# Patient Record
Sex: FEMALE | Race: WHITE | NOT HISPANIC OR LATINO
[De-identification: names, ages, dates, MRNs, and addresses within clinical notes are randomized per-mention and may not be internally consistent; named-entity substitution may affect disease eponyms.]

---
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
## 2021-01-25 ENCOUNTER — APPOINTMENT (OUTPATIENT)
Dept: PEDIATRIC SURGERY | Facility: CLINIC | Age: 21
End: 2021-01-25
Payer: COMMERCIAL

## 2021-01-25 VITALS
HEART RATE: 71 BPM | WEIGHT: 174.39 LBS | SYSTOLIC BLOOD PRESSURE: 108 MMHG | HEIGHT: 64 IN | BODY MASS INDEX: 29.77 KG/M2 | DIASTOLIC BLOOD PRESSURE: 76 MMHG

## 2021-01-25 PROCEDURE — 99243 OFF/OP CNSLTJ NEW/EST LOW 30: CPT

## 2021-01-25 PROCEDURE — 99072 ADDL SUPL MATRL&STAF TM PHE: CPT

## 2021-01-25 NOTE — CONSULT LETTER
[Dear  ___] : Dear  [unfilled], [Consult Letter:] : I had the pleasure of evaluating your patient, [unfilled]. [Please see my note below.] : Please see my note below. [Consult Closing:] : Thank you very much for allowing me to participate in the care of this patient.  If you have any questions, please do not hesitate to contact me. [Sincerely,] : Sincerely, [FreeTextEntry2] : Ovidio Polanco MD\par [address] [place] [address]\par [place] [id number] [FreeTextEntry3] : Inocente Gamez MD\par Associate Professor of Surgery and Pediatrics\par [employer] [facility]\par Pediatric Surgery\par [facility]\par [phone]\par

## 2021-01-25 NOTE — ASSESSMENT
[FreeTextEntry1] : Nadege appears to have pilonidal disease with some infection and inflammation in the area but nothing to drain as of yet.  \par I discussed the scenario with her and her mom and recommended warm soaks and baths and we have prescribed Augmentin.  They will followup with me in 1-2 weeks.

## 2021-01-25 NOTE — HISTORY OF PRESENT ILLNESS
[FreeTextEntry1] : Nadege is a 20 year old healthy female who presents here today with a likely pilonidal abscess. She developed pain approximately a week ago. She was able to appreciate fullness at her coccyx area. The pain and swelling has increased. She attends the [employer], and was seen by a PA who referred her to have an incision and drainage completed. No fevers reported.

## 2021-01-25 NOTE — REASON FOR VISIT
[Initial - Scheduled] : an initial, scheduled visit with concerns of [Pilonidal disease] : pilonidal disease  [Mother] : mother [Patient] : patient

## 2021-01-25 NOTE — PHYSICAL EXAM
[No Incision] : no incision [Acute Distress] : no acute distress [Pectus excavatum] : no pectus excavatum [Pectus carinatum] : no pectus carinatum [NL] : soft, not tender, not distended [TextBox_73] : sacrococcygeal area: mild amount hair; tender sacrococc midline with 2 small pits; no real fluctuance; mild amount erythema

## 2021-01-26 ENCOUNTER — NON-APPOINTMENT (OUTPATIENT)
Age: 21
End: 2021-01-26

## 2021-01-28 ENCOUNTER — APPOINTMENT (OUTPATIENT)
Dept: PEDIATRIC SURGERY | Facility: CLINIC | Age: 21
End: 2021-01-28
Payer: COMMERCIAL

## 2021-01-28 PROCEDURE — 11772 EXC PILONIDAL CYST COMP: CPT

## 2021-01-28 PROCEDURE — 99072 ADDL SUPL MATRL&STAF TM PHE: CPT

## 2021-01-28 PROCEDURE — 99204 OFFICE O/P NEW MOD 45 MIN: CPT | Mod: 57

## 2021-02-11 ENCOUNTER — APPOINTMENT (OUTPATIENT)
Dept: PEDIATRIC SURGERY | Facility: CLINIC | Age: 21
End: 2021-02-11

## 2021-02-25 NOTE — ADDENDUM
[FreeTextEntry1] : Documented by Sangita Man acting as a scribe for Dr. Glynn on 01/28/2021 .\par \par All medical record entries made by the Scribe were at my, Dr. Glynn, direction and personally dictated by me on 01/28/2021 . I have reviewed the chart and agree that the record accurately reflects my personal performance of the history, physical exam, assessment and plan. I have also personally directed, reviewed, and agree with the discharge instructions.\par

## 2021-02-25 NOTE — PHYSICAL EXAM
[No Incision] : no incision [NL] : no acute distress, alert [Acute Distress] : no acute distress [Pectus excavatum] : no pectus excavatum [Pectus carinatum] : no pectus carinatum [TextBox_59] : two small pits and a fluctuant area over the  cleft.  [TextBox_73] : \par two small pits and a fluctuant area over the [place] cleft

## 2021-02-25 NOTE — ASSESSMENT
[FreeTextEntry1] : Nadege is a 20 year old girl with pilonidal disease.\par \par I spent considerable time with Nadege and her mother discussing the etiology of pilonidal disease as well as the various treatment options, including conservative management with strict hair control and hygiene, minimal excision with trephination, and complete excision with cleft lift reconstruction. I discussed the risks, benefits and alternatives of the two surgical procedures including bleeding, infection, wound complications, poor wound healing, postoperative restrictions/expectations, and long-term recurrence. After our discussion both Nadege and her mother would like to proceed with incision and drainage of the abscess and excision of the pilonidal disease in the office today.\par \par PROCEDURE NOTE\par \par The risks, benefits and alternatives were discussed and consent obtained. I specifically discussed poor wound healing and recurrence.\par \par Patient position prone and buttocks taped laterally.\par Area prepped with betadine and draped\par Local analgesia injected to surgical site\par Abscess cavity opened and pus evacuated.\par All other openings excised with appropriately sized trephines\par Underlying cavity connected and curetted and all debris removed\par Cavity irrigated with saline and peroxide\par Sterile dressing placed over wounds\par \par Patient tolerated procedure well and advised to shower daily with wounds uncovered. Continue antibiotics.\par \par All questions answered, follow-up arranged

## 2021-02-25 NOTE — CONSULT LETTER
[Dear  ___] : Dear  [unfilled], [Consult Letter:] : I had the pleasure of evaluating your patient, [unfilled]. [Please see my note below.] : Please see my note below. [Consult Closing:] : Thank you very much for allowing me to participate in the care of this patient.  If you have any questions, please do not hesitate to contact me. [Sincerely,] : Sincerely, [FreeTextEntry2] : Ovidio Polanco MD\par [address] [place] [address]\par [place] [phone] [FreeTextEntry3] : Inocente Gamez MD\par Associate Professor of Surgery and Pediatrics\par [facility] [employer] at [facility]\par Pediatric Surgery\par [facility]\par [phone]\par

## 2021-02-25 NOTE — HISTORY OF PRESENT ILLNESS
[FreeTextEntry1] : Nadege is a 20 year old healthy female who presents here today to discuss surgical options for her pilonidal disease. She was seen a few days ago, and prescribed oral antibiotics. Since then Nadege states the area is more tender. She can feel it slightly increased in size. No drainage reported. No fevers reported. This is her first time dealing with pilonidal.

## 2021-02-25 NOTE — REASON FOR VISIT
[Follow-up - Scheduled] : a follow-up, scheduled visit for [Pilonidal disease] : pilonidal disease  [Mother] : mother [Patient] : patient [FreeTextEntry4] : Ovidio Polanco MD

## 2021-02-26 ENCOUNTER — APPOINTMENT (OUTPATIENT)
Dept: PEDIATRIC SURGERY | Facility: CLINIC | Age: 21
End: 2021-02-26
Payer: COMMERCIAL

## 2021-02-26 VITALS — WEIGHT: 170.64 LBS | TEMPERATURE: 98.3 F

## 2021-02-26 PROCEDURE — 99024 POSTOP FOLLOW-UP VISIT: CPT

## 2021-02-28 NOTE — ASSESSMENT
[FreeTextEntry1] : Nadege is a 20 year old girl here one month after his in office minimal excision procedure for pilonidal disease. She is doing very well. All her pits are closed and her pilonidal disease is completely healed. I counseled her on the continued 10-15% recurrence rate and encouraged her to minimize the recurrence risk with a strategy of continued hygiene and specific attention to hair control. She is free to return to full activities. I answered all of Nadege's question and would be happy to see her again anytime if there are any sign of recurrence or other issues/concerns.\par \par

## 2021-02-28 NOTE — CONSULT LETTER
[Dear  ___] : Dear  [unfilled], [Consult Letter:] : I had the pleasure of evaluating your patient, [unfilled]. [Please see my note below.] : Please see my note below. [Consult Closing:] : Thank you very much for allowing me to participate in the care of this patient.  If you have any questions, please do not hesitate to contact me. [Sincerely,] : Sincerely, [FreeTextEntry2] : Ovidio Polanco MD\par [address] [place] [address]\par [place] [id number]  [FreeTextEntry3] : Adelfo Glynn MD FAAP FACS\par Director, [facility]\par Division of Pediatric General, Thoracic and Endoscopic Surgery\par [facility]

## 2021-02-28 NOTE — ADDENDUM
[FreeTextEntry1] : Documented by Sangita Man acting as a scribe for Dr. Glynn on 02/26/2021 .\par \par All medical record entries made by the Scribe were at my, Dr. Glynn, direction and personally dictated by me on 02/26/2021 . I have reviewed the chart and agree that the record accurately reflects my personal performance of the history, physical exam, assessment and plan. I have also personally directed, reviewed, and agree with the discharge instructions.\par

## 2021-02-28 NOTE — REASON FOR VISIT
[Patient] : patient [Mother] : mother [Minimal excision of pilonidal disease] : minimal excision of pilonidal disease [de-identified] : 1/28/2021 [de-identified] : Dr. Glynn  [de-identified] : He is doing very well. She is not having any pain or drainage. She has not had any recent fevers. She reports that the wounds have healed and she is back to her baseline.

## 2021-02-28 NOTE — PHYSICAL EXAM
[Acute Distress] : no acute distress [TextBox_59] : All pits completely closed. No tenderness, erythema, fluctuance

## 2021-11-22 ENCOUNTER — EMERGENCY (EMERGENCY)
Facility: HOSPITAL | Age: 21
LOS: 1 days | Discharge: HOME | End: 2021-11-22
Attending: EMERGENCY MEDICINE
Payer: COMMERCIAL

## 2021-11-22 VITALS
HEART RATE: 70 BPM | OXYGEN SATURATION: 99 % | WEIGHT: 169.98 LBS | DIASTOLIC BLOOD PRESSURE: 93 MMHG | TEMPERATURE: 98 F | RESPIRATION RATE: 20 BRPM | HEIGHT: 65 IN | SYSTOLIC BLOOD PRESSURE: 133 MMHG

## 2021-11-22 VITALS
HEART RATE: 68 BPM | DIASTOLIC BLOOD PRESSURE: 82 MMHG | SYSTOLIC BLOOD PRESSURE: 122 MMHG | OXYGEN SATURATION: 98 % | RESPIRATION RATE: 20 BRPM

## 2021-11-22 DIAGNOSIS — K08.409 PARTIAL LOSS OF TEETH, UNSPECIFIED CAUSE, UNSPECIFIED CLASS: Chronic | ICD-10-CM

## 2021-11-22 LAB
HCOV PNL SPEC NAA+PROBE: DETECTED
RAPID RVP RESULT: DETECTED
SARS-COV-2 RNA SPEC QL NAA+PROBE: SIGNIFICANT CHANGE UP

## 2021-11-22 PROCEDURE — 0225U NFCT DS DNA&RNA 21 SARSCOV2: CPT

## 2021-11-22 PROCEDURE — 93308 TTE F-UP OR LMTD: CPT | Mod: 26

## 2021-11-22 PROCEDURE — 86664 EPSTEIN-BARR NUCLEAR ANTIGEN: CPT

## 2021-11-22 PROCEDURE — 86663 EPSTEIN-BARR ANTIBODY: CPT

## 2021-11-22 PROCEDURE — 99284 EMERGENCY DEPT VISIT MOD MDM: CPT | Mod: 25

## 2021-11-22 PROCEDURE — 93005 ELECTROCARDIOGRAM TRACING: CPT

## 2021-11-22 PROCEDURE — 99284 EMERGENCY DEPT VISIT MOD MDM: CPT | Mod: CS,25

## 2021-11-22 PROCEDURE — 36415 COLL VENOUS BLD VENIPUNCTURE: CPT

## 2021-11-22 PROCEDURE — 93010 ELECTROCARDIOGRAM REPORT: CPT | Mod: NC

## 2021-11-22 PROCEDURE — 93308 TTE F-UP OR LMTD: CPT

## 2021-11-22 PROCEDURE — 86665 EPSTEIN-BARR CAPSID VCA: CPT

## 2021-11-22 RX ORDER — ACETAMINOPHEN 500 MG
650 TABLET ORAL ONCE
Refills: 0 | Status: COMPLETED | OUTPATIENT
Start: 2021-11-22 | End: 2021-11-22

## 2021-11-22 RX ADMIN — Medication 650 MILLIGRAM(S): at 16:00

## 2021-11-22 NOTE — ED PROVIDER NOTE - PATIENT PORTAL LINK FT
You can access the FollowMyHealth Patient Portal offered by [facility] by registering at the following website: [email or web address]. By joining [employer]’s FollowMyHealth portal, you will also be able to view your health information using other applications (apps) compatible with our system.

## 2021-11-22 NOTE — ED PROVIDER NOTE - CLINICAL SUMMARY MEDICAL DECISION MAKING FREE TEXT BOX
ronni 22 yo f with no pmhx presents to ed w several days of chilss cough congestion sore throat - vaccinated strep negative mild pleurisy no sick contacts -- sent in for eval for pna - not tachypnic [id number] ra -- lungs cleat -- no fam ho cad or dvt/pe - no rsik factors - screening ekg but cp more likley pleurydynia   pocus to eval heart and lungs and rvp

## 2021-11-22 NOTE — ED PROVIDER NOTE - OBJECTIVE STATEMENT
20yo F PMH EBV presents from [facility] c/o pleuritic chest pain, cough, congestion, sore throat x4 days. Negative for strep at school. COVID vaccinated. No h/o DVT/PE. Not on AC. Non smoker. No SOB. Feels like mucus is stuck in her throat. Has taken Tylenol with some relief of symptoms. No FHx cardiac disease.

## 2021-11-22 NOTE — ED PROVIDER NOTE - NS ED ROS FT
Gen: No F/C/NS  Head: No falls   Eyes: No changes in vision   Resp: +cough, -trouble breathing  Cardiovascular: +chest pain -palpitation  Gastroenteric: No N/V/D  :  No change in urine output   MS: No joint or muscle pain  Neuro: No headache   Skin: No new rash

## 2021-11-22 NOTE — ED PROVIDER NOTE - NSFOLLOWUPCLINICS_GEN_ALL_ED_FT
[facility] - Primary Care  Primary Care  [address] [place] [address]  Phone: [phone]  Fax:

## 2021-11-22 NOTE — ED PROVIDER NOTE - NSFOLLOWUPINSTRUCTIONS_ED_ALL_ED_FT
Please follow up with your primary care doctor.    Please use the link below log into the patient portal to view your results post discharge:   [email or web address]      You should return to the hospital if you begin to have worsening or persistent chest pain especially that radiates to the arm/jaw/back, shortness of breath or chest pain with exertion that is different than normal for you, new or worsening in any lower extremity edema (swelling), sudden onset of cold sweats with difficulty breathing, or for any other concerns.      Pleurodynia      Pleurodynia is sudden, severe pain in the chest and abdomen that is caused by complications from a viral infection. Pleurodynia may also be called epidemic pleurodynia or [place] disease.    In most cases, pleurodynia goes away on its own in 2–5 days.      What are the causes?    This condition is usually caused by a virus called coxsackievirus B. In rare cases, other viruses may cause pleurodynia. These include coxsackievirus A or echoviruses.  Coxsackievirus B spreads easily from person to person (is contagious). It can be spread through:  •Coughing.      •Sneezing.      •Close physical contact with an infected person.      •Contact with the stool of an infected person.        What increases the risk?  This condition is more likely to develop in:  •Children.      •People that live in places where there are poor public health conditions (sanitation).      •People that live in places where there is overcrowding.        What are the signs or symptoms?    The main symptom of this condition is severe chest pain that makes breathing painful. The pain is usually on one side of the body, along the lower ribs. The pain starts suddenly and may last from a few seconds to 1 minute. You might feel pain again a few minutes or hours later.    These brief periods of pain usually come and go for 2–5 days. In some cases, pain may continue to come and go for as long as a month.  Other symptoms of pleurodynia may include:  •Abdominal pain.      •Fever.      •Headache.      •Sore throat.      •Feeling tired (fatigue).      •Pain when pressing on the chest or belly.      •Dry cough.      •Nausea or vomiting.      •Diarrhea.      •Rash.      •Testicle pain in men.        How is this diagnosed?  This condition is diagnosed based on:  •Your medical history.      •A physical exam.    •Tests, such as:  •A throat swab.      •Stool tests. You may need to give a stool sample to your health care provider.      •Blood tests.      •Chest X-rays.          How is this treated?    There is no specific treatment for this condition. However, symptoms go away within days or weeks. In the meantime, your health care provider may recommend medicine to help relieve pain and fever.      Follow these instructions at home:    Medicines     •Take over-the-counter and prescription medicines only as told by your health care provider.      •If your child has pleurodynia, do not give your child aspirin because of the association with Reye's syndrome.      General instructions     •Rest at home as told by your health care provider.      •Return to your normal activities as told by your health care provider. Ask your health care provider what activities are safe for you.      • Do not use any products that contain nicotine or tobacco, such as cigarettes, e-cigarettes, and chewing tobacco. If you need help quitting, ask your health care provider.      •Drink enough fluid to keep your urine pale yellow.      •Keep all follow-up visits as told by your health care provider. This is important.        How is this prevented?     •Wash your hands often to prevent the virus from spreading. If soap and water are not available, use alcohol-based hand .      •Make sure that other people in your household also wash their hands often.      •Use a bleach-based household  to clean and disinfect commonly used items and surfaces often.      • Do not share personal items such as toothbrushes and eating utensils.        Contact a health care provider if:    •Your symptoms last longer than 5 days.      •You have pain that does not get better with medicine.        Get help right away if:    •You have severe chest pain that is getting worse.      •You have difficulty breathing.      •You have a sudden, severe headache and a stiff neck.        Summary    •Pleurodynia is sudden, severe pain in the chest and abdomen that is caused by complications from a viral infection. It is usually caused by a virus called coxsackievirus B.      • Coxsackievirus B spreads easily from person to person (is contagious).      •There is no specific treatment for this condition. However, symptoms go away within days or weeks.      •Follow instructions from your health care provider about rest, medicines, activity, and drinking enough fluids. Keep all follow-up visits.      This information is not intended to replace advice given to you by your health care provider. Make sure you discuss any questions you have with your health care provider.

## 2021-11-22 NOTE — ED CLERICAL - NS ED CLERK NOTE PRE-ARRIVAL INFORMATION; ADDITIONAL PRE-ARRIVAL INFORMATION
CC/Reason For referral:  viral infection, fever, body aches, chest tightness.  covid vaxxed  Preferred Consultant(if applicable):  Who admits for you (if needed):  Do you have documents you would like to fax over?  Would you still like to speak to an ED attending? please call after patient is seen

## 2021-11-22 NOTE — ED ADULT NURSE NOTE - OBJECTIVE STATEMENT
20 y/o female presents to ED from [facility] c/o pleuritic chest pain, cough, congestion, sore throat. Pt reports the clinic at the school wanted her to have CXR and RVP/covid swab performed however they cannot do that on site so sent her here. Pt denies fever, chills, syncope, sob. A&Ox4, breathing unlabored, pt is well-appearing overall, skin warm dry and intact. US being performed at bedside.

## 2021-11-23 LAB
EBV EA AB SER IA-ACNC: <5 U/ML — SIGNIFICANT CHANGE UP
EBV EA AB TITR SER IF: POSITIVE
EBV EA IGG SER-ACNC: NEGATIVE — SIGNIFICANT CHANGE UP
EBV NA IGG SER IA-ACNC: 346 U/ML — HIGH
EBV PATRN SPEC IB-IMP: SIGNIFICANT CHANGE UP
EBV VCA IGG AVIDITY SER QL IA: POSITIVE
EBV VCA IGM SER IA-ACNC: <10 U/ML — SIGNIFICANT CHANGE UP
EBV VCA IGM SER IA-ACNC: >750 U/ML — HIGH
EBV VCA IGM TITR FLD: NEGATIVE — SIGNIFICANT CHANGE UP

## 2021-11-23 NOTE — ED POST DISCHARGE NOTE - DETAILS
Spoke to health services at [facility] who states they are aware of results - Myriam Burgess PA-C

## 2022-05-05 ENCOUNTER — APPOINTMENT (OUTPATIENT)
Dept: ORTHOPEDIC SURGERY | Facility: CLINIC | Age: 22
End: 2022-05-05
Payer: COMMERCIAL

## 2022-05-05 VITALS — HEIGHT: 65 IN | BODY MASS INDEX: 28.32 KG/M2 | WEIGHT: 170 LBS

## 2022-05-05 PROCEDURE — 99213 OFFICE O/P EST LOW 20 MIN: CPT

## 2022-05-06 ENCOUNTER — FORM ENCOUNTER (OUTPATIENT)
Age: 22
End: 2022-05-06

## 2022-05-07 ENCOUNTER — APPOINTMENT (OUTPATIENT)
Dept: MRI IMAGING | Facility: CLINIC | Age: 22
End: 2022-05-07
Payer: COMMERCIAL

## 2022-05-07 PROCEDURE — 73221 MRI JOINT UPR EXTREM W/O DYE: CPT | Mod: LT

## 2022-05-08 NOTE — HISTORY OF PRESENT ILLNESS
[6] : 6 [2] : 2 [Dull/Aching] : dull/aching [Shooting] : shooting [Throbbing] : throbbing [Intermittent] : intermittent [Rest] : rest [de-identified] : 5/5/22: mass recurred again in the right wrist after aspiration, left wrist painful, sore, stiff, thumb goes numb, occasioanlly MF too. \par \par 3/22/22: mass is back after aspiration.\par 10/28/22: R dorsal wrist mass x5-7 mos. [FreeTextEntry1] : right hand/ left wrist  [FreeTextEntry9] : advil  [de-identified] : motion

## 2022-05-08 NOTE — IMAGING
[de-identified] : right wrist- no deformity, TTP about the SL, ? mass dorsally\par FAROM with pain in terminal dorsiflexion\par NVID\par \par leftt wrist- no deformity, TTP about the SL, with small mass dorsally\par FAROM with pain in terminal dorsiflexion\par NVID

## 2022-05-08 NOTE — ASSESSMENT
[FreeTextEntry1] : The patient was advised of the diagnosis. The natural history of the pathology was explained in full to the patient in layman's terms. All questions were answered. The risks and benefits of surgical and non-surgical treatment alternatives were explained in full to the patient. \par \par mri left wrist to eval SL tear and mass\par \par right wrist- options discussed including brace, therapy, injection, surgery

## 2022-05-12 ENCOUNTER — APPOINTMENT (OUTPATIENT)
Dept: ORTHOPEDIC SURGERY | Facility: CLINIC | Age: 22
End: 2022-05-12
Payer: COMMERCIAL

## 2022-05-12 PROCEDURE — L3908: CPT

## 2022-05-12 PROCEDURE — 99214 OFFICE O/P EST MOD 30 MIN: CPT

## 2022-05-12 NOTE — DATA REVIEWED
[MRI] : MRI [Left] : left [Wrist] : wrist [I independently reviewed and interpreted images and report] : I independently reviewed and interpreted images and report [FreeTextEntry1] : 1. Partial tearing of the central/dorsal scapholunate ligament with mild surrounding synovitis.\par 2. Mild dorsal ulnar TFCC sprain with surrounding synovitis.\par 3. Nonspecific subcortical cysts in the volar trapezium where there is mild adjacent synovitis and a possible \par small ganglion (3-4 mm) volar to the trapezium along the radial aspect.\par 4. No malalignment or tendon tear.\par 5. Clinical correlation and follow up is recommended.

## 2022-05-12 NOTE — IMAGING
[de-identified] : right wrist- no deformity, TTP about the SL, ? mass dorsally\par FAROM with pain in terminal dorsiflexion\par NVID\par \par leftt wrist- no deformity, TTP about the SL, with small mass dorsally\par FAROM with pain in terminal dorsiflexion\par NVID

## 2022-05-12 NOTE — RETURN TO WORK/SCHOOL
[FreeTextEntry1] : The patient cannot participate in physical activity and has to wear a wrist brace.\par

## 2022-05-12 NOTE — HISTORY OF PRESENT ILLNESS
[6] : 6 [2] : 2 [Dull/Aching] : dull/aching [Shooting] : shooting [Throbbing] : throbbing [Intermittent] : intermittent [Rest] : rest [de-identified] : 5/12/22:  Pt is here s/p left wrist MRI\par \par MRI left wrist:\par 1. Partial tearing of the central/dorsal scapholunate ligament with mild surrounding synovitis.\par 2. Mild dorsal ulnar TFCC sprain with surrounding synovitis.\par 3. Nonspecific subcortical cysts in the volar trapezium where there is mild adjacent synovitis and a possible \par small ganglion (3-4 mm) volar to the trapezium along the radial aspect.\par 4. No malalignment or tendon tear.\par 5. Clinical correlation and follow up is recommended.\par \par 5/5/22: mass recurred again in the right wrist after aspiration, left wrist painful, sore, stiff, thumb goes numb, occasioanlly MF too. \par \par 3/22/22: mass is back after aspiration.\par 10/28/22: R dorsal wrist mass x5-7 mos. [FreeTextEntry1] : right hand/ left wrist  [FreeTextEntry9] : advil  [de-identified] : motion [de-identified] : wearing brace ( had one  @ Home) Home

## 2022-05-19 ENCOUNTER — APPOINTMENT (OUTPATIENT)
Dept: ORTHOPEDIC SURGERY | Facility: CLINIC | Age: 22
End: 2022-05-19

## 2022-05-20 ENCOUNTER — APPOINTMENT (OUTPATIENT)
Dept: ORTHOPEDIC SURGERY | Facility: AMBULATORY SURGERY CENTER | Age: 22
End: 2022-05-20
Payer: COMMERCIAL

## 2022-05-20 ENCOUNTER — RESULT REVIEW (OUTPATIENT)
Age: 22
End: 2022-05-20

## 2022-05-20 PROCEDURE — 25076 EXC FOREARM TUM DEEP < 3 CM: CPT | Mod: LT

## 2022-05-20 PROCEDURE — 25076 EXC FOREARM TUM DEEP < 3 CM: CPT | Mod: AS,LT

## 2022-06-01 ENCOUNTER — FORM ENCOUNTER (OUTPATIENT)
Age: 22
End: 2022-06-01

## 2022-06-02 ENCOUNTER — APPOINTMENT (OUTPATIENT)
Dept: ORTHOPEDIC SURGERY | Facility: CLINIC | Age: 22
End: 2022-06-02
Payer: COMMERCIAL

## 2022-06-02 VITALS — WEIGHT: 170 LBS | BODY MASS INDEX: 28.32 KG/M2 | HEIGHT: 65 IN

## 2022-06-02 DIAGNOSIS — M25.832 OTHER SPECIFIED JOINT DISORDERS, LEFT WRIST: ICD-10-CM

## 2022-06-02 PROCEDURE — 99024 POSTOP FOLLOW-UP VISIT: CPT

## 2022-06-02 NOTE — HISTORY OF PRESENT ILLNESS
[Rest] : rest [6] : 6 [2] : 2 [Dull/Aching] : dull/aching [Shooting] : shooting [Throbbing] : throbbing [Intermittent] : intermittent [de-identified] : DOS 5/20/22- Left wrist mass excision\par \par 6/2/22:  POV1- Pt is doing well\par \par 5/12/22:  Pt is here s/p left wrist MRI\par \par MRI left wrist:\par 1. Partial tearing of the central/dorsal scapholunate ligament with mild surrounding synovitis.\par 2. Mild dorsal ulnar TFCC sprain with surrounding synovitis.\par 3. Nonspecific subcortical cysts in the volar trapezium where there is mild adjacent synovitis and a possible \par small ganglion (3-4 mm) volar to the trapezium along the radial aspect.\par 4. No malalignment or tendon tear.\par 5. Clinical correlation and follow up is recommended.\par \par 5/5/22: mass recurred again in the right wrist after aspiration, left wrist painful, sore, stiff, thumb goes numb, occasioanlly MF too. \par \par 3/22/22: mass is back after aspiration.\par 10/28/22: R dorsal wrist mass x5-7 mos. [FreeTextEntry1] : right hand/ left wrist  [FreeTextEntry9] : advil  [de-identified] : motion [de-identified] : wearing brace ( had one  @ Home)

## 2022-06-02 NOTE — IMAGING
[de-identified] : wound clean dry and intact\par no erythema\par no drainage\par FAROM\par NV unchanged\par sutures removed

## 2022-09-20 ENCOUNTER — APPOINTMENT (OUTPATIENT)
Dept: CARDIOLOGY | Facility: CLINIC | Age: 22
End: 2022-09-20

## 2022-09-20 ENCOUNTER — NON-APPOINTMENT (OUTPATIENT)
Age: 22
End: 2022-09-20

## 2022-09-20 VITALS
BODY MASS INDEX: 28.99 KG/M2 | SYSTOLIC BLOOD PRESSURE: 96 MMHG | WEIGHT: 174 LBS | DIASTOLIC BLOOD PRESSURE: 71 MMHG | HEIGHT: 65 IN

## 2022-09-20 DIAGNOSIS — S63.592A OTHER SPECIFIED SPRAIN OF LEFT WRIST, INITIAL ENCOUNTER: ICD-10-CM

## 2022-09-20 DIAGNOSIS — S63.8X1A SPRAIN OF OTHER PART OF RIGHT WRIST AND HAND, INITIAL ENCOUNTER: ICD-10-CM

## 2022-09-20 DIAGNOSIS — E86.0 DEHYDRATION: ICD-10-CM

## 2022-09-20 DIAGNOSIS — L05.01 PILONIDAL CYST WITH ABSCESS: ICD-10-CM

## 2022-09-20 DIAGNOSIS — R00.1 BRADYCARDIA, UNSPECIFIED: ICD-10-CM

## 2022-09-20 DIAGNOSIS — G43.909 MIGRAINE, UNSPECIFIED, NOT INTRACTABLE, W/OUT STATUS MIGRAINOSUS: ICD-10-CM

## 2022-09-20 DIAGNOSIS — M25.511 PAIN IN RIGHT SHOULDER: ICD-10-CM

## 2022-09-20 DIAGNOSIS — T67.1XXA HEAT SYNCOPE, INITIAL ENCOUNTER: ICD-10-CM

## 2022-09-20 DIAGNOSIS — S43.431D SUPERIOR GLENOID LABRUM LESION OF RIGHT SHOULDER, SUBSEQUENT ENCOUNTER: ICD-10-CM

## 2022-09-20 PROCEDURE — 99204 OFFICE O/P NEW MOD 45 MIN: CPT | Mod: 25

## 2022-09-20 PROCEDURE — 93000 ELECTROCARDIOGRAM COMPLETE: CPT

## 2022-09-20 RX ORDER — OXYCODONE AND ACETAMINOPHEN 5; 325 MG/1; MG/1
5-325 TABLET ORAL
Qty: 30 | Refills: 0 | Status: DISCONTINUED | COMMUNITY
Start: 2019-08-09 | End: 2022-09-20

## 2022-09-20 RX ORDER — ACETAMINOPHEN AND CODEINE 300; 30 MG/1; MG/1
300-30 TABLET ORAL
Qty: 12 | Refills: 0 | Status: DISCONTINUED | COMMUNITY
Start: 2022-05-20 | End: 2022-09-20

## 2022-09-20 RX ORDER — AMOXICILLIN AND CLAVULANATE POTASSIUM 875; 125 MG/1; MG/1
875-125 TABLET, COATED ORAL
Qty: 14 | Refills: 0 | Status: DISCONTINUED | COMMUNITY
Start: 2021-01-25 | End: 2022-09-20

## 2022-09-20 NOTE — REASON FOR VISIT
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  The syncope was very brief and there was no injury resulting from it.  She has had some syncopes in the past but they have always involved either vasovagal syncope from a blood draw or with dehydration.  This latest episode happened on September 11, 2022.\par \par The patient has never smoked.  She has on average 1 alcoholic beverage a month.  She does have 1 cup of caffeinated coffee a day.\par \par Her mother has multiple sclerosis.  Her father is alive and well.\par \par She currently takes no medications.\par \par She has no known drug allergies.\par \par She denies any chest pains, shortness of breath, or palpitations

## 2022-09-20 NOTE — DISCUSSION/SUMMARY
[EKG obtained to assist in diagnosis and management of assessed problem(s)] : EKG obtained to assist in diagnosis and management of assessed problem(s) [FreeTextEntry1] : The patient was examined.  Her blood pressure was 96/71, and her pulse was 63.  Her lungs were clear to auscultation.  Cardiac exam was negative for murmurs rubs or gallops.  Her electrocardiogram showed sinus bradycardia with normal QRS complexes.  I feel that this patient's syncope was due to a combination of heat and dehydration.  She was advised strongly to keep hydrated especially in warm weather.  No new medications were started at this visit.  No further work-up is necessary at the current time.  She will return as needed.  Total time spent on the day of the encounter was      minutes which includes  face-to-face and non face-to-face times personally spent by the physician preparing to see the patient, obtaining  separately obtained history, performing a medically appropriate exam and evaluation, counseling, educating, talking to the family or caregivers, ordering medicines, ordering tests or procedures, referring and communicating with other healthcare  professionals, and documenting clinical information in the electronic health record..  The patient is cardiologically cleared to resume full activity without restrictions.

## 2022-10-03 NOTE — H&P PST ADULT - NSANTHOSAYNRD_GEN_A_CORE
Detail Level: Detailed Note Text (......Xxx Chief Complaint.): This diagnosis correlates with the Other (Free Text): Can have refills for up to one year per CE Render Risk Assessment In Note?: no No. JULISA screening performed.  STOP BANG Legend: 0-2 = LOW Risk; 3-4 = INTERMEDIATE Risk; 5-8 = HIGH Risk

## 2023-02-02 ENCOUNTER — TRANSCRIPTION ENCOUNTER (OUTPATIENT)
Age: 23
End: 2023-02-02

## 2023-05-23 NOTE — REVIEW OF SYSTEMS
WENDY    Received live call transfer from Erma from [address] [phone]:  - Pt's case is still being reviewed by clinical team, ie, if she is appropriate for supportive memory care versus long term memory care which they do not provide.    - Wait list is currently at 16 people.    - Form needs to be submitted within 30 days, so when clinical team approves her and when they have opening, Erma will call memory center.   [Negative] : Genitourinary

## 2023-12-10 NOTE — CONSULT LETTER
We will contact you in about 1 hour with swab results.      Recommend warm saltwater gargles, lozenges and numbing throat sprays  Over-the-counter Tylenol and ibuprofen as needed for fever or pain.    Go to the ER if any difficulty swallowing or breathing.    Your blood pressure was elevated today.    Recommend establishing care with a primary care provider    
[Dear  ___] : Dear  [unfilled],